# Patient Record
Sex: MALE | Race: BLACK OR AFRICAN AMERICAN | NOT HISPANIC OR LATINO
[De-identification: names, ages, dates, MRNs, and addresses within clinical notes are randomized per-mention and may not be internally consistent; named-entity substitution may affect disease eponyms.]

---

## 2021-06-14 PROBLEM — Z00.00 ENCOUNTER FOR PREVENTIVE HEALTH EXAMINATION: Status: ACTIVE | Noted: 2021-06-14

## 2021-06-17 ENCOUNTER — NON-APPOINTMENT (OUTPATIENT)
Age: 26
End: 2021-06-17

## 2021-06-17 ENCOUNTER — APPOINTMENT (OUTPATIENT)
Dept: CARDIOLOGY | Facility: CLINIC | Age: 26
End: 2021-06-17
Payer: COMMERCIAL

## 2021-06-17 VITALS
HEIGHT: 76 IN | SYSTOLIC BLOOD PRESSURE: 118 MMHG | TEMPERATURE: 98 F | BODY MASS INDEX: 33.61 KG/M2 | DIASTOLIC BLOOD PRESSURE: 72 MMHG | WEIGHT: 276 LBS | HEART RATE: 59 BPM | OXYGEN SATURATION: 97 %

## 2021-06-17 VITALS — SYSTOLIC BLOOD PRESSURE: 118 MMHG | DIASTOLIC BLOOD PRESSURE: 74 MMHG

## 2021-06-17 DIAGNOSIS — R06.02 SHORTNESS OF BREATH: ICD-10-CM

## 2021-06-17 DIAGNOSIS — F17.200 NICOTINE DEPENDENCE, UNSPECIFIED, UNCOMPLICATED: ICD-10-CM

## 2021-06-17 DIAGNOSIS — Z78.9 OTHER SPECIFIED HEALTH STATUS: ICD-10-CM

## 2021-06-17 DIAGNOSIS — R07.89 OTHER CHEST PAIN: ICD-10-CM

## 2021-06-17 DIAGNOSIS — Z82.49 FAMILY HISTORY OF ISCHEMIC HEART DISEASE AND OTHER DISEASES OF THE CIRCULATORY SYSTEM: ICD-10-CM

## 2021-06-17 DIAGNOSIS — E66.9 OBESITY, UNSPECIFIED: ICD-10-CM

## 2021-06-17 DIAGNOSIS — R73.9 HYPERGLYCEMIA, UNSPECIFIED: ICD-10-CM

## 2021-06-17 DIAGNOSIS — F17.210 NICOTINE DEPENDENCE, CIGARETTES, UNCOMPLICATED: ICD-10-CM

## 2021-06-17 PROCEDURE — 99072 ADDL SUPL MATRL&STAF TM PHE: CPT

## 2021-06-17 PROCEDURE — 93000 ELECTROCARDIOGRAM COMPLETE: CPT

## 2021-06-17 PROCEDURE — 99244 OFF/OP CNSLTJ NEW/EST MOD 40: CPT

## 2021-06-17 NOTE — PHYSICAL EXAM
[Well Developed] : well developed [Well Nourished] : well nourished [No Acute Distress] : no acute distress [Obese] : obese [Normal Conjunctiva] : normal conjunctiva [Normal Venous Pressure] : normal venous pressure [No Carotid Bruit] : no carotid bruit [Normal S1, S2] : normal S1, S2 [No Murmur] : no murmur [No Rub] : no rub [No Gallop] : no gallop [Clear Lung Fields] : clear lung fields [Good Air Entry] : good air entry [No Respiratory Distress] : no respiratory distress  [Soft] : abdomen soft [Non Tender] : non-tender [No Masses/organomegaly] : no masses/organomegaly [Normal Bowel Sounds] : normal bowel sounds [Normal Gait] : normal gait [No Edema] : no edema [No Cyanosis] : no cyanosis [No Clubbing] : no clubbing [No Varicosities] : no varicosities [Normal Radial B/L] : normal radial B/L [Normal PT B/L] : normal PT B/L [Normal DP B/L] : normal DP B/L [No Rash] : no rash [No Skin Lesions] : no skin lesions [Moves all extremities] : moves all extremities [No Focal Deficits] : no focal deficits [Normal Speech] : normal speech [Alert and Oriented] : alert and oriented [Normal memory] : normal memory [de-identified] : Equal pulses bilaterally

## 2021-06-17 NOTE — REVIEW OF SYSTEMS
[SOB] : shortness of breath [Dyspnea on exertion] : not dyspnea during exertion [Chest Discomfort] : chest discomfort [Lower Ext Edema] : no extremity edema [Leg Claudication] : no intermittent leg claudication [Palpitations] : no palpitations [Orthopnea] : no orthopnea [PND] : no PND [Syncope] : no syncope [Negative] : Heme/Lymph

## 2021-06-17 NOTE — DISCUSSION/SUMMARY
[FreeTextEntry1] : 26-year-old with above medical history active medical problems as noted below\par 1.  Atypical chest pain.  EKG no acute changes.  CAD risk factors include male gender.  And history of smoking.\par Recommended exercise treadmill stress test to assess evaluate exercise and relationship with his symptoms.  Rule out ischemic heart disease/obstructive or symptomatic anomalous coronary disease.  \par Reviewed high risk symptoms to notify us/call 911 or go to the nearest emergency room.  He understood and agreed with the management plan.\par 2.  Dyspnea on exertion.  Atypical chest pain.  History of smoking echocardiogram will be done in this young gentleman to evaluate LV ejection fraction wall motion wall thickness pericardial space.  Pulmonary artery systolic pressure.\par 3 cigarette smoking.  Reviewed risk and benefits.  Options discussed.  He is not ready to quit smoking.  Strongly recommended to quit smoking\par 4.  Obesity.  Lifestyle modification at length discussed.  Relationship with cardiovascular disease reviewed.\par 5.  Marijuana use.  Recommended to avoid it while using high risk equipments driving etc.\par #6 hyperglycemia.  Hemoglobin A1c has been ordered.  Lifestyle modification with dietary restrictions reviewed.\par \par Counseling regarding low saturated fat, salt and carbohydrate intake was reviewed. Active lifestyle and regular. Exercise along with weight management is advised.\par All the above were at length reviewed. Answered all the questions. Thank you very much for this kind referral. Please do not hesitate to give me a call for any question.\par Part of this transcription was done with voice recognition software and phonetically similar errors are common. I apologize for that. Please do not hesitate to call for any questions due to above.\par \par

## 2021-06-17 NOTE — COUNSELING
[Risk of tobacco use and health benefits of smoking cessation discussed] : Risk of tobacco use and health benefits of smoking cessation discussed [Cessation strategies including cessation program discussed] : Cessation strategies including cessation program discussed [Encouraged to pick a quit date and identify support needed to quit] : Encouraged to pick a quit date and identify support needed to quit [Use of nicotine replacement therapies and other medications discussed] : Use of nicotine replacement therapies and other medications discussed [Willing to Quit Smoking] : Not willing to quit smoking [FreeTextEntry1] : 5

## 2021-06-17 NOTE — ASSESSMENT
[FreeTextEntry1] : Reviewed June 17, 2021\par EKG.  Outside sinus bradycardia early repolarization\par EKG today normal sinus rhythm\par Labs June 16, 2021 stable CBC CMP with glucose 111 potassium 4.5 sodium 136 creatinine 0.95 LDL 94 HDL 33 total cholesterol 152 triglycerides 124

## 2021-06-17 NOTE — HISTORY OF PRESENT ILLNESS
[FreeTextEntry1] : HPI\par History of smoking cigarettes 2 packs/week\par Family history of hypertension\par Obesity

## 2021-06-17 NOTE — REASON FOR VISIT
[Symptom and Test Evaluation] : symptom and test evaluation [Other: ____] : [unfilled] [Family Member] : family member [FreeTextEntry3] : Dr. Hampton [FreeTextEntry1] : 26-year-old young male is seen in the office referred for consultation for management of chest pain/shortness of breath.\par He works as a .\par For last 1 month has noticed sharp localized left precordial chest pain lasting for few minutes.  Occasionally associated with shortness of breath.  About twice a week.  Often at rest.  No reproducibility with activity or exercise.  Does not occur when he works hard as a .  There is no associated palpitation arm pain jaw pain diaphoresis nausea vomiting.\par He has no dizziness lightheadedness near syncopal or syncopal event\par He is seen no significant progressive worsening.\par He is a smoker.  About 2 pack a week.\par He also uses marijuana recreationally.\par He denies any significant alcohol intake\par He denies any history of hypertension, diabetes mellitus, myocardial infarction, cerebrovascular accident, history of peripheral arterial disease, rheumatic fever, thyroid or Lyme disease.\par He does not do regular exercise but his work is hard and walks a lot.\par He does not control his diet for saturated fat carbohydrate low salt intake.\par He is overweight.\par He denies any sleep disturbances.\par He has no claudication pain

## 2021-07-22 ENCOUNTER — APPOINTMENT (OUTPATIENT)
Dept: CARDIOLOGY | Facility: CLINIC | Age: 26
End: 2021-07-22